# Patient Record
Sex: MALE | Race: WHITE | NOT HISPANIC OR LATINO | Employment: OTHER | URBAN - METROPOLITAN AREA
[De-identification: names, ages, dates, MRNs, and addresses within clinical notes are randomized per-mention and may not be internally consistent; named-entity substitution may affect disease eponyms.]

---

## 2018-01-31 ENCOUNTER — TRANSCRIBE ORDERS (OUTPATIENT)
Dept: LAB | Facility: HOSPITAL | Age: 58
End: 2018-01-31

## 2018-01-31 ENCOUNTER — APPOINTMENT (OUTPATIENT)
Dept: LAB | Facility: HOSPITAL | Age: 58
End: 2018-01-31
Payer: COMMERCIAL

## 2018-01-31 DIAGNOSIS — Z30.2 ENCOUNTER FOR VASECTOMY: Primary | ICD-10-CM

## 2018-01-31 DIAGNOSIS — Z30.2 ENCOUNTER FOR VASECTOMY: ICD-10-CM

## 2018-01-31 LAB
COMMENT POST VAS: ABNORMAL
PH SMN: 8.7 [PH] (ref 7.2–8.6)
POST  VAS COLLECTION: ABNORMAL
SPECIMEN VOL SMN: 0.2 ML (ref 1–5)
VISC SMN: 2 CP (ref 3–4)
WBC SMN QL: 3 "HPF"

## 2018-01-31 PROCEDURE — 89321 SEMEN ANAL SPERM DETECTION: CPT

## 2018-02-01 NOTE — PROGRESS NOTES
I called pt and gave him his results  He understands he is to repeat another semen analysis in about 6 weeks with at least 8 ejaculations  And to call us about a week after he has testing done for the results

## 2018-02-13 DIAGNOSIS — N52.9 ERECTILE DYSFUNCTION, UNSPECIFIED ERECTILE DYSFUNCTION TYPE: Primary | ICD-10-CM

## 2018-02-13 RX ORDER — SILDENAFIL 50 MG/1
50 TABLET, FILM COATED ORAL DAILY PRN
Qty: 10 TABLET | Refills: 5 | Status: SHIPPED | OUTPATIENT
Start: 2018-02-13

## 2018-04-09 DIAGNOSIS — N52.9 ERECTILE DYSFUNCTION, UNSPECIFIED ERECTILE DYSFUNCTION TYPE: Primary | ICD-10-CM

## 2018-04-10 RX ORDER — TADALAFIL 5 MG/1
TABLET ORAL
Qty: 10 TABLET | Refills: 4 | Status: SHIPPED | OUTPATIENT
Start: 2018-04-10

## 2018-10-25 PROCEDURE — 88112 CYTOPATH CELL ENHANCE TECH: CPT | Performed by: PATHOLOGY

## 2018-10-26 ENCOUNTER — LAB REQUISITION (OUTPATIENT)
Dept: LAB | Facility: HOSPITAL | Age: 58
End: 2018-10-26
Payer: COMMERCIAL

## 2018-10-26 DIAGNOSIS — R31.0 GROSS HEMATURIA: ICD-10-CM
